# Patient Record
Sex: FEMALE | Race: OTHER | NOT HISPANIC OR LATINO | URBAN - METROPOLITAN AREA
[De-identification: names, ages, dates, MRNs, and addresses within clinical notes are randomized per-mention and may not be internally consistent; named-entity substitution may affect disease eponyms.]

---

## 2017-08-19 ENCOUNTER — EMERGENCY (EMERGENCY)
Facility: HOSPITAL | Age: 72
LOS: 1 days | Discharge: HOME | End: 2017-08-19
Attending: EMERGENCY MEDICINE | Admitting: EMERGENCY MEDICINE
Payer: MEDICARE

## 2017-08-19 VITALS
RESPIRATION RATE: 16 BRPM | DIASTOLIC BLOOD PRESSURE: 75 MMHG | HEART RATE: 69 BPM | WEIGHT: 139.99 LBS | OXYGEN SATURATION: 95 % | TEMPERATURE: 98 F | SYSTOLIC BLOOD PRESSURE: 132 MMHG

## 2017-08-19 VITALS
HEART RATE: 59 BPM | SYSTOLIC BLOOD PRESSURE: 163 MMHG | DIASTOLIC BLOOD PRESSURE: 84 MMHG | OXYGEN SATURATION: 100 % | RESPIRATION RATE: 18 BRPM | TEMPERATURE: 98 F

## 2017-08-19 LAB — APTT BLD: 31 SEC — SIGNIFICANT CHANGE UP (ref 27.5–37.4)

## 2017-08-19 PROCEDURE — 99284 EMERGENCY DEPT VISIT MOD MDM: CPT | Mod: GC

## 2017-08-19 PROCEDURE — 80053 COMPREHEN METABOLIC PANEL: CPT

## 2017-08-19 PROCEDURE — 85027 COMPLETE CBC AUTOMATED: CPT

## 2017-08-19 PROCEDURE — 85610 PROTHROMBIN TIME: CPT

## 2017-08-19 PROCEDURE — 82272 OCCULT BLD FECES 1-3 TESTS: CPT

## 2017-08-19 PROCEDURE — 85730 THROMBOPLASTIN TIME PARTIAL: CPT

## 2017-08-19 PROCEDURE — 99283 EMERGENCY DEPT VISIT LOW MDM: CPT | Mod: 25

## 2017-08-19 RX ORDER — LISINOPRIL 2.5 MG/1
0 TABLET ORAL
Qty: 0 | Refills: 0 | COMMUNITY

## 2017-08-19 RX ORDER — PREGABALIN 225 MG/1
0 CAPSULE ORAL
Qty: 0 | Refills: 0 | COMMUNITY

## 2017-08-19 RX ORDER — METFORMIN HYDROCHLORIDE 850 MG/1
0 TABLET ORAL
Qty: 0 | Refills: 0 | COMMUNITY

## 2017-08-19 RX ORDER — PANTOPRAZOLE SODIUM 20 MG/1
0 TABLET, DELAYED RELEASE ORAL
Qty: 0 | Refills: 0 | COMMUNITY

## 2017-08-19 RX ORDER — METOPROLOL TARTRATE 50 MG
0 TABLET ORAL
Qty: 0 | Refills: 0 | COMMUNITY

## 2017-08-19 RX ORDER — AMLODIPINE BESYLATE 2.5 MG/1
0 TABLET ORAL
Qty: 0 | Refills: 0 | COMMUNITY

## 2017-08-19 NOTE — ED PROVIDER NOTE - NS ED ROS FT
CONST: no fevers, no chills, no lightheadedness  EYES: no pain, no vision change  HENT: atraumatic, no sore throat  CV: no chest pain, no palpitations  RESP: no shortness of breath  ABD: no abdominal pain, no nausea/vomiting  : dark stools, no dysuria, no hematuria  MSK: no musculoskeletal pain  NEURO: no headache, no focal weakness or loss of sensation  SKIN:  no rash, no lesions

## 2017-08-19 NOTE — ED PROVIDER NOTE - OBJECTIVE STATEMENT
72 yo F w/ pmh dm, htn, gerd p/w 3 episodes dark stools over last 2 days, no abdominal pain, last colonoscopy 2 yrs ago. Pt had rectal bleeding before, dx w/ h.pylori gastritis. Pt denies diarrhea, constipation, dysuria, hematuria, lightheadedness, nausea, vomit. Pt not on blood thinners. Last BM at 4:30pm.     PCP: Dr. Tu Lynch [phone]

## 2017-08-19 NOTE — ED ADULT NURSE NOTE - OBJECTIVE STATEMENT
71y female c/o black stool. Hx of HTN, DM, and HLD. States black stools since yesterday x3. Denies bright res blood in stool. States similar episode 15 years ago, dx with H. pylori. Reports slight epigastric pain yesterday, denies abdominal pain upon arrival to ED. Denies urinary symptoms. Denies chest pain, sob, fever/chills, n/v/d.

## 2017-08-19 NOTE — ED PROVIDER NOTE - PHYSICAL EXAMINATION
*Gen:   comfortable, in no apparent distress, AOx3  *Eyes:   PERRL, extra-occular movements intact  *HEENT:   airway patent, most mucosal membranes  *CV:   regular rate and rhythm, normal S1/S2  *Resp:   clear to auscultation bilaterally, non-labored  *Abd:   soft, non tender, no guarding  *:   no cva tenderness; rectal exam: chaperone by Fafa, no masses palpated, hemoccult test NEGATIVE  *MSK:   no musculoskeletal tenderness  *Skin:   dry, intact  *Neuro:   AOx3, no focal weakness or loss of sensation, gait normal

## 2017-08-19 NOTE — ED PROVIDER NOTE - ATTENDING CONTRIBUTION TO CARE
72 y/o f with pmhx presents with 2 episodes of painless dark stools today. 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 Her last colo was 2 years ago and has not since followed with her GI MD. No fever no chills no vomiting. No gross bleeding. No other complaints.   Gen. no acute distress, Non toxic   HEENT: EOMI, mmm,   Lungs: CTAB/L no C/ W /R   CVS: S1S2   Abd; Soft non tender, non distended   Ext: no edema   Neuro AAOx3 non focal clear speech see below

## 2017-08-19 NOTE — ED PROVIDER NOTE - PROGRESS NOTE DETAILS
Dr. Luiz Hoyt PGY1: pt tova, vitals wnl, labs came back [id number] coags wnl, pt ok for DC w/ close GI f/u and outpatient colonoscopy.

## 2017-08-19 NOTE — ED PROVIDER NOTE - MEDICAL DECISION MAKING DETAILS
70 yo F w/ htn, dm, gerd p/w painless rectal bleed, no constitutional symptoms. Pt appears comfortable, vitals wnl, does not look hemodynamically unstable. If labs wnl, okay for DC and outpatient colonoscopy / follow up. 70 yo F w/ htn, dm, gerd p/w painless rectal bleed, no constitutional symptoms. Pt appears comfortable, vitals wnl, does not look hemodynamically unstable. If labs wnl, okay for DC and outpatient colonoscopy / follow up.  ATTD: dark stool, no gross blood but given hx will check labs, guiaic neg. no abd pain. if labs wnl, will have her follow with GI as outpt.

## 2017-09-12 ENCOUNTER — RESULT REVIEW (OUTPATIENT)
Age: 72
End: 2017-09-12

## 2018-06-23 ENCOUNTER — RESULT REVIEW (OUTPATIENT)
Age: 73
End: 2018-06-23

## 2019-10-05 ENCOUNTER — OUTPATIENT (OUTPATIENT)
Dept: OUTPATIENT SERVICES | Facility: HOSPITAL | Age: 74
LOS: 1 days | End: 2019-10-05
Payer: MEDICARE

## 2019-10-05 ENCOUNTER — APPOINTMENT (OUTPATIENT)
Dept: CT IMAGING | Facility: IMAGING CENTER | Age: 74
End: 2019-10-05
Payer: MEDICARE

## 2019-10-05 DIAGNOSIS — Z00.8 ENCOUNTER FOR OTHER GENERAL EXAMINATION: ICD-10-CM

## 2019-10-05 PROBLEM — E11.9 TYPE 2 DIABETES MELLITUS WITHOUT COMPLICATIONS: Chronic | Status: ACTIVE | Noted: 2017-08-19

## 2019-10-05 PROBLEM — I10 ESSENTIAL (PRIMARY) HYPERTENSION: Chronic | Status: ACTIVE | Noted: 2017-08-19

## 2019-10-05 PROCEDURE — 82565 ASSAY OF CREATININE: CPT

## 2019-10-05 PROCEDURE — 74178 CT ABD&PLV WO CNTR FLWD CNTR: CPT | Mod: 26

## 2019-10-05 PROCEDURE — 74178 CT ABD&PLV WO CNTR FLWD CNTR: CPT

## 2022-09-01 NOTE — ED ADULT NURSE REASSESSMENT NOTE - NS ED NURSE REASSESS COMMENT FT1
Subjective:   Carolyn presents today for her left knee Synvisc-One injection for pain and arthritis.    Objective:  Visit Vitals  Resp 18   Ht 5' 7\" (1.702 m)   Wt (!) 137 kg (302 lb)   LMP 05/05/2022 (Approximate)   BMI 47.30 kg/m²     A verbal timeout was performed to confirm the procedure and location with the patient.  Then 6 mL of Synvisc-One injection was injected into the left knee under sterile betadine prep without difficulty.    Assessment/Plan:   Carolyn will wait 4-6 weeks for the injection to have its full affect and then wait to see how long the results last. The patient may repeat this in the future if she would like, but should wait at least 6 months before repeating.  Patient will follow up as needed.    Jez Jones PA-C   Orthopedics   Don Schaeffer DO (Supervising Physician)     Patient A&Ox3, neuro intact. BP high, Tnag MD aware prior to discharge. Instructed to follow up with GI and PMD.